# Patient Record
Sex: MALE | Race: WHITE | Employment: FULL TIME | URBAN - METROPOLITAN AREA
[De-identification: names, ages, dates, MRNs, and addresses within clinical notes are randomized per-mention and may not be internally consistent; named-entity substitution may affect disease eponyms.]

---

## 2017-09-08 PROCEDURE — 81001 URINALYSIS AUTO W/SCOPE: CPT | Performed by: INTERNAL MEDICINE

## 2017-09-08 PROCEDURE — 87086 URINE CULTURE/COLONY COUNT: CPT | Performed by: INTERNAL MEDICINE

## 2017-09-11 PROBLEM — K40.90 INGUINAL HERNIA, LEFT: Status: ACTIVE | Noted: 2017-09-11

## 2017-10-12 PROBLEM — K40.90 RIGHT INGUINAL HERNIA: Status: ACTIVE | Noted: 2017-10-12

## 2019-03-15 PROCEDURE — 81003 URINALYSIS AUTO W/O SCOPE: CPT | Performed by: INTERNAL MEDICINE

## 2021-02-23 PROBLEM — M25.512 LEFT SHOULDER PAIN, UNSPECIFIED CHRONICITY: Status: ACTIVE | Noted: 2021-02-23

## 2021-06-28 PROCEDURE — 88305 TISSUE EXAM BY PATHOLOGIST: CPT | Performed by: INTERNAL MEDICINE

## 2023-04-20 RX ORDER — ROSUVASTATIN CALCIUM 20 MG/1
20 TABLET, COATED ORAL NIGHTLY
COMMUNITY

## 2023-04-25 ENCOUNTER — EKG ENCOUNTER (OUTPATIENT)
Dept: LAB | Facility: HOSPITAL | Age: 53
End: 2023-04-25
Attending: SURGERY
Payer: COMMERCIAL

## 2023-04-25 DIAGNOSIS — Z01.818 PRE-OP TESTING: ICD-10-CM

## 2023-04-25 LAB
ATRIAL RATE: 51 BPM
P AXIS: 41 DEGREES
P-R INTERVAL: 164 MS
Q-T INTERVAL: 418 MS
QRS DURATION: 84 MS
QTC CALCULATION (BEZET): 385 MS
R AXIS: 25 DEGREES
T AXIS: 13 DEGREES
VENTRICULAR RATE: 51 BPM

## 2023-04-25 PROCEDURE — 93005 ELECTROCARDIOGRAM TRACING: CPT

## 2023-04-25 PROCEDURE — 93010 ELECTROCARDIOGRAM REPORT: CPT | Performed by: INTERNAL MEDICINE

## 2023-05-05 ENCOUNTER — ANESTHESIA EVENT (OUTPATIENT)
Dept: SURGERY | Facility: HOSPITAL | Age: 53
End: 2023-05-05
Payer: COMMERCIAL

## 2023-05-08 ENCOUNTER — HOSPITAL ENCOUNTER (OUTPATIENT)
Facility: HOSPITAL | Age: 53
Setting detail: HOSPITAL OUTPATIENT SURGERY
Discharge: HOME | End: 2023-05-08
Attending: SURGERY | Admitting: SURGERY
Payer: COMMERCIAL

## 2023-05-08 ENCOUNTER — ANESTHESIA (OUTPATIENT)
Dept: SURGERY | Facility: HOSPITAL | Age: 53
End: 2023-05-08
Payer: COMMERCIAL

## 2023-05-08 VITALS
DIASTOLIC BLOOD PRESSURE: 96 MMHG | HEART RATE: 59 BPM | HEIGHT: 70 IN | BODY MASS INDEX: 31.26 KG/M2 | RESPIRATION RATE: 16 BRPM | SYSTOLIC BLOOD PRESSURE: 143 MMHG | OXYGEN SATURATION: 98 % | WEIGHT: 218.38 LBS | TEMPERATURE: 97 F

## 2023-05-08 DIAGNOSIS — Z01.818 PRE-OP TESTING: Primary | ICD-10-CM

## 2023-05-08 PROCEDURE — 0YU64JZ SUPPLEMENT LEFT INGUINAL REGION WITH SYNTHETIC SUBSTITUTE, PERCUTANEOUS ENDOSCOPIC APPROACH: ICD-10-PCS | Performed by: SURGERY

## 2023-05-08 PROCEDURE — 8E0W4CZ ROBOTIC ASSISTED PROCEDURE OF TRUNK REGION, PERCUTANEOUS ENDOSCOPIC APPROACH: ICD-10-PCS | Performed by: SURGERY

## 2023-05-08 DEVICE — LAPAROSCOPIC SELF-FIXATING MESH POLYESTER WITH POLYLACTIC ACID GRIPS AND COLLAGEN FILM
Type: IMPLANTABLE DEVICE | Site: INGUINAL | Status: FUNCTIONAL
Brand: PROGRIP

## 2023-05-08 RX ORDER — LIDOCAINE HYDROCHLORIDE 10 MG/ML
INJECTION, SOLUTION EPIDURAL; INFILTRATION; INTRACAUDAL; PERINEURAL AS NEEDED
Status: DISCONTINUED | OUTPATIENT
Start: 2023-05-08 | End: 2023-05-08 | Stop reason: SURG

## 2023-05-08 RX ORDER — ACETAMINOPHEN 500 MG
1000 TABLET ORAL ONCE
Status: DISCONTINUED | OUTPATIENT
Start: 2023-05-08 | End: 2023-05-08 | Stop reason: HOSPADM

## 2023-05-08 RX ORDER — PROCHLORPERAZINE EDISYLATE 5 MG/ML
5 INJECTION INTRAMUSCULAR; INTRAVENOUS EVERY 8 HOURS PRN
Status: DISCONTINUED | OUTPATIENT
Start: 2023-05-08 | End: 2023-05-08

## 2023-05-08 RX ORDER — ONDANSETRON 2 MG/ML
INJECTION INTRAMUSCULAR; INTRAVENOUS AS NEEDED
Status: DISCONTINUED | OUTPATIENT
Start: 2023-05-08 | End: 2023-05-08 | Stop reason: SURG

## 2023-05-08 RX ORDER — NALOXONE HYDROCHLORIDE 0.4 MG/ML
80 INJECTION, SOLUTION INTRAMUSCULAR; INTRAVENOUS; SUBCUTANEOUS AS NEEDED
Status: DISCONTINUED | OUTPATIENT
Start: 2023-05-08 | End: 2023-05-08

## 2023-05-08 RX ORDER — HYDROCODONE BITARTRATE AND ACETAMINOPHEN 5; 325 MG/1; MG/1
2 TABLET ORAL ONCE AS NEEDED
Status: DISCONTINUED | OUTPATIENT
Start: 2023-05-08 | End: 2023-05-08

## 2023-05-08 RX ORDER — HYDROCODONE BITARTRATE AND ACETAMINOPHEN 5; 325 MG/1; MG/1
1 TABLET ORAL ONCE AS NEEDED
Status: DISCONTINUED | OUTPATIENT
Start: 2023-05-08 | End: 2023-05-08

## 2023-05-08 RX ORDER — DEXAMETHASONE SODIUM PHOSPHATE 4 MG/ML
VIAL (ML) INJECTION AS NEEDED
Status: DISCONTINUED | OUTPATIENT
Start: 2023-05-08 | End: 2023-05-08 | Stop reason: SURG

## 2023-05-08 RX ORDER — NEOSTIGMINE METHYLSULFATE 1 MG/ML
INJECTION, SOLUTION INTRAVENOUS AS NEEDED
Status: DISCONTINUED | OUTPATIENT
Start: 2023-05-08 | End: 2023-05-08 | Stop reason: SURG

## 2023-05-08 RX ORDER — ROCURONIUM BROMIDE 10 MG/ML
INJECTION, SOLUTION INTRAVENOUS AS NEEDED
Status: DISCONTINUED | OUTPATIENT
Start: 2023-05-08 | End: 2023-05-08 | Stop reason: SURG

## 2023-05-08 RX ORDER — BUPIVACAINE HYDROCHLORIDE AND EPINEPHRINE 5; 5 MG/ML; UG/ML
INJECTION, SOLUTION EPIDURAL; INTRACAUDAL; PERINEURAL AS NEEDED
Status: DISCONTINUED | OUTPATIENT
Start: 2023-05-08 | End: 2023-05-08 | Stop reason: HOSPADM

## 2023-05-08 RX ORDER — OXYCODONE HYDROCHLORIDE AND ACETAMINOPHEN 5; 325 MG/1; MG/1
1 TABLET ORAL EVERY 4 HOURS PRN
Qty: 30 TABLET | Refills: 0 | Status: SHIPPED | OUTPATIENT
Start: 2023-05-08 | End: 2023-05-18

## 2023-05-08 RX ORDER — HYDROMORPHONE HYDROCHLORIDE 1 MG/ML
0.2 INJECTION, SOLUTION INTRAMUSCULAR; INTRAVENOUS; SUBCUTANEOUS EVERY 5 MIN PRN
Status: DISCONTINUED | OUTPATIENT
Start: 2023-05-08 | End: 2023-05-08

## 2023-05-08 RX ORDER — HYDROMORPHONE HYDROCHLORIDE 1 MG/ML
0.6 INJECTION, SOLUTION INTRAMUSCULAR; INTRAVENOUS; SUBCUTANEOUS EVERY 5 MIN PRN
Status: DISCONTINUED | OUTPATIENT
Start: 2023-05-08 | End: 2023-05-08

## 2023-05-08 RX ORDER — SODIUM CHLORIDE, SODIUM LACTATE, POTASSIUM CHLORIDE, CALCIUM CHLORIDE 600; 310; 30; 20 MG/100ML; MG/100ML; MG/100ML; MG/100ML
INJECTION, SOLUTION INTRAVENOUS CONTINUOUS
Status: DISCONTINUED | OUTPATIENT
Start: 2023-05-08 | End: 2023-05-08

## 2023-05-08 RX ORDER — ACETAMINOPHEN 500 MG
1000 TABLET ORAL ONCE AS NEEDED
Status: DISCONTINUED | OUTPATIENT
Start: 2023-05-08 | End: 2023-05-08

## 2023-05-08 RX ORDER — HYDROMORPHONE HYDROCHLORIDE 1 MG/ML
0.4 INJECTION, SOLUTION INTRAMUSCULAR; INTRAVENOUS; SUBCUTANEOUS EVERY 5 MIN PRN
Status: DISCONTINUED | OUTPATIENT
Start: 2023-05-08 | End: 2023-05-08

## 2023-05-08 RX ORDER — HEPARIN SODIUM 5000 [USP'U]/ML
5000 INJECTION, SOLUTION INTRAVENOUS; SUBCUTANEOUS ONCE
Status: COMPLETED | OUTPATIENT
Start: 2023-05-08 | End: 2023-05-08

## 2023-05-08 RX ORDER — SCOLOPAMINE TRANSDERMAL SYSTEM 1 MG/1
1 PATCH, EXTENDED RELEASE TRANSDERMAL ONCE
Status: DISCONTINUED | OUTPATIENT
Start: 2023-05-08 | End: 2023-05-08 | Stop reason: HOSPADM

## 2023-05-08 RX ORDER — CEFAZOLIN SODIUM/WATER 2 G/20 ML
2 SYRINGE (ML) INTRAVENOUS ONCE
Status: COMPLETED | OUTPATIENT
Start: 2023-05-08 | End: 2023-05-08

## 2023-05-08 RX ORDER — GLYCOPYRROLATE 0.2 MG/ML
INJECTION, SOLUTION INTRAMUSCULAR; INTRAVENOUS AS NEEDED
Status: DISCONTINUED | OUTPATIENT
Start: 2023-05-08 | End: 2023-05-08 | Stop reason: SURG

## 2023-05-08 RX ORDER — KETOROLAC TROMETHAMINE 30 MG/ML
INJECTION, SOLUTION INTRAMUSCULAR; INTRAVENOUS AS NEEDED
Status: DISCONTINUED | OUTPATIENT
Start: 2023-05-08 | End: 2023-05-08 | Stop reason: SURG

## 2023-05-08 RX ORDER — ONDANSETRON 2 MG/ML
4 INJECTION INTRAMUSCULAR; INTRAVENOUS EVERY 6 HOURS PRN
Status: DISCONTINUED | OUTPATIENT
Start: 2023-05-08 | End: 2023-05-08

## 2023-05-08 RX ADMIN — DEXAMETHASONE SODIUM PHOSPHATE 4 MG: 4 MG/ML VIAL (ML) INJECTION at 11:13:00

## 2023-05-08 RX ADMIN — GLYCOPYRROLATE 0.8 MG: 0.2 INJECTION, SOLUTION INTRAMUSCULAR; INTRAVENOUS at 11:44:00

## 2023-05-08 RX ADMIN — KETOROLAC TROMETHAMINE 30 MG: 30 INJECTION, SOLUTION INTRAMUSCULAR; INTRAVENOUS at 11:42:00

## 2023-05-08 RX ADMIN — LIDOCAINE HYDROCHLORIDE 50 MG: 10 INJECTION, SOLUTION EPIDURAL; INFILTRATION; INTRACAUDAL; PERINEURAL at 11:06:00

## 2023-05-08 RX ADMIN — ONDANSETRON 4 MG: 2 INJECTION INTRAMUSCULAR; INTRAVENOUS at 11:42:00

## 2023-05-08 RX ADMIN — ROCURONIUM BROMIDE 50 MG: 10 INJECTION, SOLUTION INTRAVENOUS at 11:06:00

## 2023-05-08 RX ADMIN — CEFAZOLIN SODIUM/WATER 2 G: 2 G/20 ML SYRINGE (ML) INTRAVENOUS at 11:15:00

## 2023-05-08 RX ADMIN — NEOSTIGMINE METHYLSULFATE 5 MG: 1 INJECTION, SOLUTION INTRAVENOUS at 11:44:00

## 2023-05-08 RX ADMIN — SODIUM CHLORIDE, SODIUM LACTATE, POTASSIUM CHLORIDE, CALCIUM CHLORIDE: 600; 310; 30; 20 INJECTION, SOLUTION INTRAVENOUS at 11:13:00

## 2023-05-08 NOTE — H&P
Melina Garcia is a 47year old male who presents for evaluation of a left inguinal hernia. Patient describes a bulge and mild discomfort especially with physical activity. Patient denies any symptoms of obstruction or strangulation. Prior Hernia Surgery: Right inguinal hernia     Past Medical History:   Diagnosis Date   Elevated fasting glucose / HbA1C ~ 5.1% 4/10/2015   HTN (hypertension)   Hyperlipidemia   S/P [10/17] right inguinal hernia repair - BRIANBertha Hernandez* 3/10/2013   S/P [] arthroscopy repair of Rt labrum - TODD Vega 10/31/2014     Past Surgical History:   Procedure Laterality Date   HERNIA SURGERY 10/06/2017   Right inguinal w/mesh     Current Outpatient Medications   Medication Sig Dispense Refill   hydroCHLOROthiazide 12.5 MG Oral Cap Take 1 capsule (12.5 mg total) by mouth daily. 90 capsule 3   rosuvastatin (CRESTOR) 10 MG Oral Tab Take 1 tablet (10 mg total) by mouth nightly. 90 tablet 3   losartan 100 MG Oral Tab Take 1 tablet (100 mg total) by mouth daily. 90 tablet 3   Cholecalciferol (VITAMIN D3) 25 MCG (1000 UT) Oral Cap Take 1 tablet by mouth daily. omega-3 fatty acids 1000 MG Oral Cap Take 1,000 mg by mouth daily. Glucosamine-Chondroitin (GLUCOSAMINE CHONDR COMPLEX OR) Take by mouth. No Known Allergies    Family History   Problem Relation Age of Onset   Cancer Father   Hypertension Father   Hypertension Mother   Psychiatric Sister   Psychiatric Sister     Social History  Tobacco Use  Smoking status: Former  Packs/day: 1.00  Years: 17.00  Pack years: 17  Types: Cigarettes  Quit date: 6/15/2002  Years since quittin.8  Smokeless tobacco: Former  Types: Chew  Quit date: 8/15/2013  Vaping Use  Vaping Use: Never used  Alcohol use:  Yes  Alcohol/week: 2.0 standard drinks  Types: 2 Standard drinks or equivalent per week  Drug use: No    ROS:    10 point review performed with pertinent positives and negatives per history of present illness    EXAM:    GENERAL: well developed, well nourished male, in no apparent distress  SKIN: anicteric  HEENT: normocephalic, sclera anicteric  NECK: supple, no JVD  RESPIRATORY: clear to auscultation  CARDIOVASCULAR: RRR  ABDOMEN: normal active BS, soft and no tenderness, no mass, no umbilical hernia  INGUINAL: left inguinal hernia, reducible and mild tenderness, no right inguinal hernia  SCROTUM: no testicular mass  LYMPHATIC: no lymphadenopathy  EXTREMITIES: no cyanosis or edema    IMPRESSION/PLAN    1. Left Inguinal Hernia: Robotic assisted left Inguinal Hernia Repair with Mesh    Risks of surgery were discussed in detail including but not limited to infection, bleeding, recurrent hernia,conversion to an open procedure, shoulder pain, DVT, PE and MI. We also discussed the possibility of contralateral hernia repair if discovered incidentally at the time of surgery. Hernia surgery booklet given to patient and questions answered. 2. Hypertension    3.  Hyperlipidemia

## 2023-05-08 NOTE — H&P
HPI:    Ayesha Trujillo is a 47year old male who presents for evaluation of a left inguinal hernia. Patient describes a bulge and mild discomfort especially with physical activity. Patient denies any symptoms of obstruction or strangulation. Prior Hernia Surgery: Right inguinal hernia     Past Medical History:   Diagnosis Date   Elevated fasting glucose / HbA1C ~ 5.1% 4/10/2015   HTN (hypertension)   Hyperlipidemia   S/P [10/17] right inguinal hernia repair - BRIANBertha Hernandez* 3/10/2013   S/P [] arthroscopy repair of Rt labrum - TODD Vega 10/31/2014     Past Surgical History:   Procedure Laterality Date   HERNIA SURGERY 10/06/2017   Right inguinal w/mesh     Current Outpatient Medications   Medication Sig Dispense Refill   hydroCHLOROthiazide 12.5 MG Oral Cap Take 1 capsule (12.5 mg total) by mouth daily. 90 capsule 3   rosuvastatin (CRESTOR) 10 MG Oral Tab Take 1 tablet (10 mg total) by mouth nightly. 90 tablet 3   losartan 100 MG Oral Tab Take 1 tablet (100 mg total) by mouth daily. 90 tablet 3   Cholecalciferol (VITAMIN D3) 25 MCG (1000 UT) Oral Cap Take 1 tablet by mouth daily. omega-3 fatty acids 1000 MG Oral Cap Take 1,000 mg by mouth daily. Glucosamine-Chondroitin (GLUCOSAMINE CHONDR COMPLEX OR) Take by mouth. No Known Allergies    Family History   Problem Relation Age of Onset   Cancer Father   Hypertension Father   Hypertension Mother   Psychiatric Sister   Psychiatric Sister     Social History  Tobacco Use  Smoking status: Former  Packs/day: 1.00  Years: 17.00  Pack years: 17  Types: Cigarettes  Quit date: 6/15/2002  Years since quittin.8  Smokeless tobacco: Former  Types: Chew  Quit date: 8/15/2013  Vaping Use  Vaping Use: Never used  Alcohol use:  Yes  Alcohol/week: 2.0 standard drinks  Types: 2 Standard drinks or equivalent per week  Drug use: No    ROS:    10 point review performed with pertinent positives and negatives per history of present illness    EXAM:    GENERAL: well developed, well nourished male, in no apparent distress  SKIN: anicteric  HEENT: normocephalic, sclera anicteric  NECK: supple, no JVD  RESPIRATORY: clear to auscultation  CARDIOVASCULAR: RRR  ABDOMEN: normal active BS, soft and no tenderness, no mass, no umbilical hernia  INGUINAL: left inguinal hernia, reducible and mild tenderness, no right inguinal hernia  SCROTUM: no testicular mass  LYMPHATIC: no lymphadenopathy  EXTREMITIES: no cyanosis or edema    IMPRESSION/PLAN    1. Left Inguinal Hernia: Robotic assisted left Inguinal Hernia Repair with Mesh    Risks of surgery were discussed in detail including but not limited to infection, bleeding, recurrent hernia,conversion to an open procedure, shoulder pain, DVT, PE and MI. We also discussed the possibility of contralateral hernia repair if discovered incidentally at the time of surgery. Hernia surgery booklet given to patient and questions answered. 2. Hypertension    3.  Hyperlipidemia

## 2023-05-08 NOTE — ANESTHESIA POSTPROCEDURE EVALUATION
Jesús Patient Status:  Hospital Outpatient Surgery   Age/Gender 47year old male MRN [id number]   Location [address] [facility] Attending Lakshmi Astudillo MD   Hosp Day # 0 PCP Adia Springer MD       Anesthesia Post-op Note    ROBOTIC ASSISTED REPAIR OF LEFT  INGUINAL HERNIA WITH MESH    Procedure Summary     Date: 05/08/23 Room / Location: [facility] OR 08 / [facility] OR    Anesthesia Start: 3962 Anesthesia Stop: 9616    Procedure: ROBOTIC ASSISTED REPAIR OF LEFT  INGUINAL HERNIA WITH MESH (Left) Diagnosis: (left inguinal hernia)    Surgeons: Lakshmi Astudillo MD Anesthesiologist: Rober Lopez MD    Anesthesia Type: general ASA Status: 2          Anesthesia Type: general    Vitals Value Taken Time   /92 05/08/23 1159   Temp 98.0 05/08/23 1200   Pulse 69 05/08/23 1200   Resp 13 05/08/23 1200   SpO2 97 % 05/08/23 1200   Vitals shown include unvalidated device data. Patient Location: PACU    Anesthesia Type: general    Airway Patency: patent    Postop Pain Control: adequate    Mental Status: preanesthetic baseline    Nausea/Vomiting: none    Cardiopulmonary/Hydration status: stable euvolemic    Complications: no apparent anesthesia related complications    Postop vital signs: stable    Dental Exam: Unchanged from Preop    Patient to be discharged from PACU when criteria met.

## 2023-05-08 NOTE — ANESTHESIA PROCEDURE NOTES
Airway  Date/Time: 5/8/2023 11:09 AM  Urgency: elective    Airway not difficult    General Information and Staff    Patient location during procedure: OR  Anesthesiologist: Shanta Anna MD  Performed: anesthesiologist   Performed by: Shanta Anna MD  Authorized by: Shanta Anna MD      Indications and Patient Condition  Indications for airway management: anesthesia  Sedation level: deep  Preoxygenated: yes  Patient position: sniffing  Mask difficulty assessment: 2 - vent by mask + OA or adjuvant +/- NMBA    Final Airway Details  Final airway type: endotracheal airway      Successful airway: ETT  Cuffed: yes   Successful intubation technique: direct laryngoscopy  Endotracheal tube insertion site: oral  Blade: Ivan  Blade size: #4  ETT size (mm): 7.5    Cormack-Lehane Classification: grade I - full view of glottis  Placement verified by: chest auscultation and capnometry   Cuff volume (mL): 7  Measured from: lips  ETT to lips (cm): 22  Number of attempts at approach: 1

## 2023-05-08 NOTE — OPERATIVE REPORT
Report of [id number] Patient Status:  Hospital Outpatient Surgery    1970 MRN [id number]   [facility] SURGERY Attending Ronnie Toscano MD   [facility] Day # 0 PCP Isaac Osorio MD       2023    Hattie Nowak    PRE-OPERATIVE DIAGNOSIS:                     left Inguinal Hernia    POST-OPERATIVE DIAGNOSIS:                    Same    PROCEDURE:                                                Robotic-assisted lefrt Inguinal Hernia Repair     SURGEON:                                                    Ewa Choi MD    ASSISTANT:                                                  Eufemia Steele sa    A surgical assistant was medically necessary and needed for the entire procedure to help with positioning, prepping, instrument passing and holding, opening, closing, and suturing. ANESTHESIA:                                                General    EBL:                                                               5cc  Procedure: The patient was taken to the operating suite after informed consent and proper identification, administered a general anesthetic. The patient's abdomen was prepped and draped in the usual sterile fashion. An Umbilical incision was performed with a #11 scalpel. Veress needle was introduced in the peritoneal cavity. Pneumoperitoneum was created. An 8 mm trocar was introduced through the Umbilical incision and a robotic scope was introduced. An 8 mm trocar was placed in the right abdomen; another 8 mm trocar was placed in the left abdomen. The Birch's was docked to the patient. Operating surgeon went below to the operating console. Patient had a left inguinal hernia; no evidence of a right inguinal hernia. Peritoneum was incised in the left inguinal region. Preperitoneal space was developed sharply and bluntly. Hernia sac was dissected off of the cord structures.   Once this space was developed, a ProGrip mesh was placed over the hernia defect with wide overlay. Once this was secured, the peritoneum was closed with an interlocking 2-0 V-Loc suture. The pneumoperitoneum was then decompressed and all ports were removed. Skin incisions were closed with 4-0 Vicryl in a subcuticular fashion. The wounds were infiltrated with local anesthesia, Dermabond applied directly to the incision surface. The patient tolerated the procedure well.     Dilan Blanton MD  5/8/2023

## 2023-06-14 ENCOUNTER — LAB REQUISITION (OUTPATIENT)
Dept: LAB | Facility: HOSPITAL | Age: 53
End: 2023-06-14
Payer: COMMERCIAL

## 2023-06-14 DIAGNOSIS — D49.4 NEOPLASM OF UNSPECIFIED BEHAVIOR OF BLADDER: ICD-10-CM

## 2023-06-14 PROCEDURE — 88307 TISSUE EXAM BY PATHOLOGIST: CPT | Performed by: UROLOGY

## (undated) DEVICE — SPONGE STICK WITH PVP-I: Brand: KENDALL

## (undated) DEVICE — DERMABOND CLOSURE 0.7ML TOPICL

## (undated) DEVICE — ROBOTIC GENERAL: Brand: MEDLINE INDUSTRIES, INC.

## (undated) DEVICE — TIP COVER ACCESSORY

## (undated) DEVICE — UNDYED BRAIDED (POLYGLACTIN 910), SYNTHETIC ABSORBABLE SUTURE: Brand: COATED VICRYL

## (undated) DEVICE — CANNULA SEAL

## (undated) DEVICE — COLUMN DRAPE

## (undated) DEVICE — DRAPE,TOP,102X53,STERILE: Brand: MEDLINE

## (undated) DEVICE — TRAY SURESTEP 16 BARDEX UMETR

## (undated) DEVICE — PROGRASP FORCEPS: Brand: ENDOWRIST

## (undated) DEVICE — COVER WAND RF DETECT

## (undated) DEVICE — ARM DRAPE

## (undated) DEVICE — LAPAROVUE VISIBILITY SYSTEM LAPAROSCOPIC SOLUTIONS: Brand: LAPAROVUE

## (undated) DEVICE — SUTURE VLOC 90 2-0 9" 2145

## (undated) DEVICE — LIGHT HANDLE

## (undated) DEVICE — MEGA SUTURECUT ND: Brand: ENDOWRIST

## (undated) DEVICE — STERILE POLYISOPRENE POWDER-FREE SURGICAL GLOVES: Brand: PROTEXIS

## (undated) DEVICE — MONOPOLAR CURVED SCISSORS: Brand: ENDOWRIST

## (undated) DEVICE — 40580 - THE PINK PAD - ADVANCED TRENDELENBURG POSITIONING KIT: Brand: 40580 - THE PINK PAD - ADVANCED TRENDELENBURG POSITIONING KIT

## (undated) DEVICE — BLADELESS OBTURATOR: Brand: WECK VISTA

## (undated) DEVICE — ENDOPATH ULTRA VERESS INSUFFLATION NEEDLES WITH LUER LOCK CONNECTORS: Brand: ENDOPATH